# Patient Record
Sex: FEMALE | Race: BLACK OR AFRICAN AMERICAN | Employment: PART TIME | URBAN - METROPOLITAN AREA
[De-identification: names, ages, dates, MRNs, and addresses within clinical notes are randomized per-mention and may not be internally consistent; named-entity substitution may affect disease eponyms.]

---

## 2017-07-18 ENCOUNTER — HOSPITAL ENCOUNTER (OUTPATIENT)
Dept: GENERAL RADIOLOGY | Age: 27
Discharge: HOME | End: 2017-07-18

## 2017-07-18 ENCOUNTER — OFFICE VISIT (OUTPATIENT)
Dept: FAMILY MEDICINE CLINIC | Age: 27
End: 2017-07-18

## 2017-07-18 VITALS
WEIGHT: 183.2 LBS | HEART RATE: 75 BPM | TEMPERATURE: 97.8 F | RESPIRATION RATE: 16 BRPM | DIASTOLIC BLOOD PRESSURE: 75 MMHG | SYSTOLIC BLOOD PRESSURE: 123 MMHG | BODY MASS INDEX: 29.44 KG/M2 | OXYGEN SATURATION: 100 % | HEIGHT: 66 IN

## 2017-07-18 DIAGNOSIS — M54.2 NECK PAIN: ICD-10-CM

## 2017-07-18 DIAGNOSIS — M54.2 NECK PAIN: Primary | ICD-10-CM

## 2017-07-18 DIAGNOSIS — S20.212A CONTUSION OF CHEST WALL, LEFT, INITIAL ENCOUNTER: ICD-10-CM

## 2017-07-18 DIAGNOSIS — S80.00XA CONTUSION OF KNEE, UNSPECIFIED LATERALITY, INITIAL ENCOUNTER: ICD-10-CM
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
## 2017-07-18 DIAGNOSIS — R07.89 CHEST WALL PAIN: ICD-10-CM

## 2017-07-18 PROCEDURE — 99214 OFFICE O/P EST MOD 30 MIN: CPT | Performed by: NURSE PRACTITIONER

## 2017-07-18 RX ORDER — CYCLOBENZAPRINE HCL 10 MG
5-10 TABLET ORAL 3 TIMES DAILY PRN
Qty: 30 TABLET | Refills: 0 | Status: SHIPPED | OUTPATIENT
Start: 2017-07-18 | End: 2019-02-01

## 2017-07-18 RX ORDER — NAPROXEN 500 MG/1
500 TABLET ORAL 2 TIMES DAILY WITH MEALS
Qty: 60 TABLET | Refills: 0 | Status: SHIPPED | OUTPATIENT
Start: 2017-07-18 | End: 2019-02-01

## 2017-07-18 ASSESSMENT — ENCOUNTER SYMPTOMS
BACK PAIN: 0
EYES NEGATIVE: 1
NAUSEA: 0
ALLERGIC/IMMUNOLOGIC NEGATIVE: 1
VOMITING: 0
COUGH: 0
SPUTUM PRODUCTION: 0
GASTROINTESTINAL NEGATIVE: 1
SORE THROAT: 0
TROUBLE SWALLOWING: 0
CHEST TIGHTNESS: 0
SHORTNESS OF BREATH: 0
ABDOMINAL PAIN: 0
HEMOPTYSIS: 0
ORTHOPNEA: 0
PHOTOPHOBIA: 0
VISUAL CHANGE: 0

## 2017-07-18 ASSESSMENT — PATIENT HEALTH QUESTIONNAIRE - PHQ9
2. FEELING DOWN, DEPRESSED OR HOPELESS: 0
SUM OF ALL RESPONSES TO PHQ9 QUESTIONS 1 & 2: 0
SUM OF ALL RESPONSES TO PHQ QUESTIONS 1-9: 0
1. LITTLE INTEREST OR PLEASURE IN DOING THINGS: 0

## 2017-07-18 ASSESSMENT — COPD QUESTIONNAIRES: COPD: 0

## 2017-07-19 ENCOUNTER — TELEPHONE (OUTPATIENT)
Dept: FAMILY MEDICINE CLINIC | Age: 27
End: 2017-07-19

## 2017-07-26 ENCOUNTER — OFFICE VISIT (OUTPATIENT)
Dept: FAMILY MEDICINE CLINIC | Age: 27
End: 2017-07-26

## 2017-07-26 VITALS
WEIGHT: 182 LBS | SYSTOLIC BLOOD PRESSURE: 115 MMHG | HEIGHT: 66 IN | OXYGEN SATURATION: 97 % | BODY MASS INDEX: 29.25 KG/M2 | DIASTOLIC BLOOD PRESSURE: 77 MMHG | TEMPERATURE: 98.1 F | HEART RATE: 69 BPM | RESPIRATION RATE: 16 BRPM

## 2017-07-26 DIAGNOSIS — M25.562 ACUTE PAIN OF BOTH KNEES: Primary | ICD-10-CM

## 2017-07-26 DIAGNOSIS — S80.00XA CONTUSION OF KNEE, UNSPECIFIED LATERALITY, INITIAL ENCOUNTER: ICD-10-CM

## 2017-07-26 DIAGNOSIS — M25.561 ACUTE PAIN OF BOTH KNEES: Primary | ICD-10-CM
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
## 2017-07-26 PROCEDURE — 99213 OFFICE O/P EST LOW 20 MIN: CPT | Performed by: NURSE PRACTITIONER

## 2017-07-26 RX ORDER — MELOXICAM 15 MG/1
15 TABLET ORAL DAILY
Qty: 30 TABLET | Refills: 0 | Status: SHIPPED | OUTPATIENT
Start: 2017-07-26 | End: 2019-02-01

## 2017-07-26 ASSESSMENT — ENCOUNTER SYMPTOMS
RESPIRATORY NEGATIVE: 1
COLOR CHANGE: 1
GASTROINTESTINAL NEGATIVE: 1
EYES NEGATIVE: 1
ALLERGIC/IMMUNOLOGIC NEGATIVE: 1

## 2019-02-01 ENCOUNTER — OFFICE VISIT (OUTPATIENT)
Dept: ORTHOPEDIC SURGERY | Age: 29
End: 2019-02-01
Payer: MEDICAID

## 2019-02-01 ENCOUNTER — ANESTHESIA EVENT (OUTPATIENT)
Dept: OPERATING ROOM | Age: 29
End: 2019-02-01
Payer: MEDICAID

## 2019-02-01 ENCOUNTER — TELEPHONE (OUTPATIENT)
Dept: ORTHOPEDIC SURGERY | Age: 29
End: 2019-02-01

## 2019-02-01 VITALS
HEART RATE: 96 BPM | BODY MASS INDEX: 29.27 KG/M2 | HEIGHT: 66 IN | DIASTOLIC BLOOD PRESSURE: 88 MMHG | WEIGHT: 182.1 LBS | SYSTOLIC BLOOD PRESSURE: 133 MMHG

## 2019-02-01 DIAGNOSIS — S82.891A CLOSED FRACTURE OF RIGHT ANKLE, INITIAL ENCOUNTER: ICD-10-CM

## 2019-02-01 DIAGNOSIS — M25.571 RIGHT ANKLE PAIN, UNSPECIFIED CHRONICITY: Primary | ICD-10-CM

## 2019-02-01 PROCEDURE — 99203 OFFICE O/P NEW LOW 30 MIN: CPT | Performed by: ORTHOPAEDIC SURGERY

## 2019-02-04 ENCOUNTER — HOSPITAL ENCOUNTER (OUTPATIENT)
Age: 29
Setting detail: OUTPATIENT SURGERY
Discharge: HOME | End: 2019-02-04
Attending: ORTHOPAEDIC SURGERY | Admitting: ORTHOPAEDIC SURGERY
Payer: MEDICAID

## 2019-02-04 ENCOUNTER — ANESTHESIA (OUTPATIENT)
Dept: OPERATING ROOM | Age: 29
End: 2019-02-04
Payer: MEDICAID

## 2019-02-04 VITALS
DIASTOLIC BLOOD PRESSURE: 71 MMHG | TEMPERATURE: 97 F | WEIGHT: 180 LBS | SYSTOLIC BLOOD PRESSURE: 121 MMHG | BODY MASS INDEX: 28.93 KG/M2 | HEIGHT: 66 IN | RESPIRATION RATE: 16 BRPM | OXYGEN SATURATION: 98 % | HEART RATE: 76 BPM

## 2019-02-04 VITALS
TEMPERATURE: 98.6 F | DIASTOLIC BLOOD PRESSURE: 51 MMHG | SYSTOLIC BLOOD PRESSURE: 101 MMHG | OXYGEN SATURATION: 97 % | RESPIRATION RATE: 10 BRPM

## 2019-02-04 DIAGNOSIS — S82.891D CLOSED FRACTURE OF RIGHT ANKLE WITH ROUTINE HEALING, SUBSEQUENT ENCOUNTER: Primary | ICD-10-CM

## 2019-02-04 LAB — PREGNANCY, URINE: NEGATIVE

## 2019-02-04 PROCEDURE — 3600000014 HC SURGERY LEVEL 4 ADDTL 15MIN: Performed by: ORTHOPAEDIC SURGERY

## 2019-02-04 PROCEDURE — C1713 ANCHOR/SCREW BN/BN,TIS/BN: HCPCS | Performed by: ORTHOPAEDIC SURGERY

## 2019-02-04 PROCEDURE — 3600000004 HC SURGERY LEVEL 4 BASE: Performed by: ORTHOPAEDIC SURGERY

## 2019-02-04 PROCEDURE — 7100000001 HC PACU RECOVERY - ADDTL 15 MIN: Performed by: ORTHOPAEDIC SURGERY

## 2019-02-04 PROCEDURE — 84703 CHORIONIC GONADOTROPIN ASSAY: CPT

## 2019-02-04 PROCEDURE — 6360000002 HC RX W HCPCS: Performed by: NURSE ANESTHETIST, CERTIFIED REGISTERED

## 2019-02-04 PROCEDURE — 2709999900 HC NON-CHARGEABLE SUPPLY: Performed by: ORTHOPAEDIC SURGERY

## 2019-02-04 PROCEDURE — 7100000010 HC PHASE II RECOVERY - FIRST 15 MIN: Performed by: ORTHOPAEDIC SURGERY

## 2019-02-04 PROCEDURE — 64445 NJX AA&/STRD SCIATIC NRV IMG: CPT | Performed by: ANESTHESIOLOGY

## 2019-02-04 PROCEDURE — 7100000000 HC PACU RECOVERY - FIRST 15 MIN: Performed by: ORTHOPAEDIC SURGERY

## 2019-02-04 PROCEDURE — 2580000003 HC RX 258

## 2019-02-04 PROCEDURE — 3700000000 HC ANESTHESIA ATTENDED CARE: Performed by: ORTHOPAEDIC SURGERY

## 2019-02-04 PROCEDURE — 64447 NJX AA&/STRD FEMORAL NRV IMG: CPT | Performed by: ANESTHESIOLOGY

## 2019-02-04 PROCEDURE — 6360000002 HC RX W HCPCS: Performed by: ANESTHESIOLOGY

## 2019-02-04 PROCEDURE — 3700000001 HC ADD 15 MINUTES (ANESTHESIA): Performed by: ORTHOPAEDIC SURGERY

## 2019-02-04 PROCEDURE — 7100000011 HC PHASE II RECOVERY - ADDTL 15 MIN: Performed by: ORTHOPAEDIC SURGERY

## 2019-02-04 PROCEDURE — 2580000003 HC RX 258: Performed by: NURSE ANESTHETIST, CERTIFIED REGISTERED

## 2019-02-04 PROCEDURE — 6360000002 HC RX W HCPCS: Performed by: ORTHOPAEDIC SURGERY

## 2019-02-04 PROCEDURE — 2500000003 HC RX 250 WO HCPCS: Performed by: ANESTHESIOLOGY

## 2019-02-04 PROCEDURE — 2720000010 HC SURG SUPPLY STERILE: Performed by: ORTHOPAEDIC SURGERY

## 2019-02-04 PROCEDURE — 2500000003 HC RX 250 WO HCPCS

## 2019-02-04 DEVICE — EVOS 3.5MM X 13MM CORTEX SCREW SELF-TAPPING
Type: IMPLANTABLE DEVICE | Site: ANKLE | Status: FUNCTIONAL
Brand: EVOS

## 2019-02-04 DEVICE — EVOS 2.7MM X 15MM LOCKING SCREW T8 SELF-TAPPING
Type: IMPLANTABLE DEVICE | Site: ANKLE | Status: FUNCTIONAL
Brand: EVOS

## 2019-02-04 DEVICE — EVOS 2.7MM X 12MM LOCKING SCREW T8 SELF-TAPPING
Type: IMPLANTABLE DEVICE | Site: ANKLE | Status: FUNCTIONAL
Brand: EVOS

## 2019-02-04 DEVICE — EVOS 2.7MM X 24MM CORTEX SCREW T8 SELF-TAPPING
Type: IMPLANTABLE DEVICE | Site: ANKLE | Status: FUNCTIONAL
Brand: EVOS

## 2019-02-04 DEVICE — KIT INVISIKNOT ANKLE FRACTURE
Type: IMPLANTABLE DEVICE | Site: ANKLE | Status: FUNCTIONAL
Brand: INVISIKNOT

## 2019-02-04 DEVICE — EVOS 3.5MM X 12MM CORTEX SCREW SELF-TAPPING
Type: IMPLANTABLE DEVICE | Site: ANKLE | Status: FUNCTIONAL
Brand: EVOS

## 2019-02-04 DEVICE — EVOS 3.5MM X 14MM CORTEX SCREW SELF-TAPPING
Type: IMPLANTABLE DEVICE | Site: ANKLE | Status: FUNCTIONAL
Brand: EVOS

## 2019-02-04 DEVICE — EVOS 2.7MM X 14MM LOCKING SCREW T8 SELF-TAPPING
Type: IMPLANTABLE DEVICE | Site: ANKLE | Status: FUNCTIONAL
Brand: EVOS

## 2019-02-04 DEVICE — PERI-LOC K-WIRE 1.6MM X 150MM                                    LENGTH TROCAR POINT
Type: IMPLANTABLE DEVICE | Site: ANKLE | Status: FUNCTIONAL
Brand: PERI-LOC

## 2019-02-04 DEVICE — EVOS 2.7MM/3.5MM LATERAL DISTAL                                    FIBULA PLATE 5 HOLE RIGHT 81MM
Type: IMPLANTABLE DEVICE | Site: ANKLE | Status: FUNCTIONAL
Brand: EVOS

## 2019-02-04 DEVICE — EVOS 2.7MM X 10MM LOCKING SCREW T8 SELF-TAPPING
Type: IMPLANTABLE DEVICE | Site: ANKLE | Status: FUNCTIONAL
Brand: EVOS

## 2019-02-04 RX ORDER — LIDOCAINE HYDROCHLORIDE 10 MG/ML
1 INJECTION, SOLUTION EPIDURAL; INFILTRATION; INTRACAUDAL; PERINEURAL
Status: COMPLETED | OUTPATIENT
Start: 2019-02-04 | End: 2019-02-04

## 2019-02-04 RX ORDER — OXYCODONE HYDROCHLORIDE AND ACETAMINOPHEN 5; 325 MG/1; MG/1
1 TABLET ORAL PRN
Status: DISCONTINUED | OUTPATIENT
Start: 2019-02-04 | End: 2019-02-04 | Stop reason: HOSPADM

## 2019-02-04 RX ORDER — KETOROLAC TROMETHAMINE 30 MG/ML
INJECTION, SOLUTION INTRAMUSCULAR; INTRAVENOUS PRN
Status: DISCONTINUED | OUTPATIENT
Start: 2019-02-04 | End: 2019-02-04 | Stop reason: SDUPTHER

## 2019-02-04 RX ORDER — ASPIRIN 325 MG
325 TABLET, DELAYED RELEASE (ENTERIC COATED) ORAL 2 TIMES DAILY WITH MEALS
Qty: 30 TABLET | Refills: 0 | Status: SHIPPED | OUTPATIENT
Start: 2019-02-04 | End: 2019-06-14

## 2019-02-04 RX ORDER — DIPHENHYDRAMINE HYDROCHLORIDE 50 MG/ML
12.5 INJECTION INTRAMUSCULAR; INTRAVENOUS
Status: DISCONTINUED | OUTPATIENT
Start: 2019-02-04 | End: 2019-02-04 | Stop reason: HOSPADM

## 2019-02-04 RX ORDER — MEPERIDINE HYDROCHLORIDE 25 MG/ML
12.5 INJECTION INTRAMUSCULAR; INTRAVENOUS; SUBCUTANEOUS EVERY 5 MIN PRN
Status: DISCONTINUED | OUTPATIENT
Start: 2019-02-04 | End: 2019-02-04 | Stop reason: HOSPADM

## 2019-02-04 RX ORDER — OXYCODONE HYDROCHLORIDE AND ACETAMINOPHEN 5; 325 MG/1; MG/1
2 TABLET ORAL PRN
Status: DISCONTINUED | OUTPATIENT
Start: 2019-02-04 | End: 2019-02-04 | Stop reason: HOSPADM

## 2019-02-04 RX ORDER — MIDAZOLAM HYDROCHLORIDE 1 MG/ML
INJECTION INTRAMUSCULAR; INTRAVENOUS
Status: COMPLETED
Start: 2019-02-04 | End: 2019-02-04

## 2019-02-04 RX ORDER — ONDANSETRON 2 MG/ML
INJECTION INTRAMUSCULAR; INTRAVENOUS PRN
Status: DISCONTINUED | OUTPATIENT
Start: 2019-02-04 | End: 2019-02-04 | Stop reason: SDUPTHER

## 2019-02-04 RX ORDER — SODIUM CHLORIDE 0.9 % (FLUSH) 0.9 %
10 SYRINGE (ML) INJECTION PRN
Status: DISCONTINUED | OUTPATIENT
Start: 2019-02-04 | End: 2019-02-04 | Stop reason: HOSPADM

## 2019-02-04 RX ORDER — BUPIVACAINE HYDROCHLORIDE 5 MG/ML
INJECTION, SOLUTION EPIDURAL; INTRACAUDAL
Status: COMPLETED
Start: 2019-02-04 | End: 2019-02-04

## 2019-02-04 RX ORDER — LIDOCAINE HYDROCHLORIDE 20 MG/ML
INJECTION, SOLUTION INFILTRATION; PERINEURAL PRN
Status: DISCONTINUED | OUTPATIENT
Start: 2019-02-04 | End: 2019-02-04 | Stop reason: SDUPTHER

## 2019-02-04 RX ORDER — LIDOCAINE HYDROCHLORIDE 20 MG/ML
INJECTION, SOLUTION INFILTRATION; PERINEURAL
Status: COMPLETED
Start: 2019-02-04 | End: 2019-02-04

## 2019-02-04 RX ORDER — OXYCODONE HYDROCHLORIDE AND ACETAMINOPHEN 5; 325 MG/1; MG/1
1 TABLET ORAL EVERY 6 HOURS PRN
Qty: 28 TABLET | Refills: 0 | Status: SHIPPED | OUTPATIENT
Start: 2019-02-04 | End: 2019-02-11

## 2019-02-04 RX ORDER — BUPIVACAINE HYDROCHLORIDE 2.5 MG/ML
INJECTION, SOLUTION EPIDURAL; INFILTRATION; INTRACAUDAL PRN
Status: DISCONTINUED | OUTPATIENT
Start: 2019-02-04 | End: 2019-02-04 | Stop reason: SDUPTHER

## 2019-02-04 RX ORDER — IBUPROFEN 600 MG/1
600 TABLET ORAL EVERY 6 HOURS PRN
Status: ON HOLD | COMMUNITY
End: 2019-02-04 | Stop reason: HOSPADM

## 2019-02-04 RX ORDER — CEFAZOLIN SODIUM 2 G/50ML
2 SOLUTION INTRAVENOUS ONCE
Status: COMPLETED | OUTPATIENT
Start: 2019-02-04 | End: 2019-02-04

## 2019-02-04 RX ORDER — ONDANSETRON 4 MG/1
4 TABLET, FILM COATED ORAL EVERY 8 HOURS PRN
Qty: 20 TABLET | Refills: 0 | Status: SHIPPED | OUTPATIENT
Start: 2019-02-04 | End: 2019-06-14

## 2019-02-04 RX ORDER — LIDOCAINE HYDROCHLORIDE 10 MG/ML
INJECTION, SOLUTION EPIDURAL; INFILTRATION; INTRACAUDAL; PERINEURAL
Status: COMPLETED
Start: 2019-02-04 | End: 2019-02-04

## 2019-02-04 RX ORDER — ACETAMINOPHEN 10 MG/ML
1000 INJECTION, SOLUTION INTRAVENOUS ONCE
Status: COMPLETED | OUTPATIENT
Start: 2019-02-04 | End: 2019-02-04

## 2019-02-04 RX ORDER — MORPHINE SULFATE 10 MG/ML
1 INJECTION, SOLUTION INTRAMUSCULAR; INTRAVENOUS EVERY 5 MIN PRN
Status: DISCONTINUED | OUTPATIENT
Start: 2019-02-04 | End: 2019-02-04 | Stop reason: HOSPADM

## 2019-02-04 RX ORDER — HYDRALAZINE HYDROCHLORIDE 20 MG/ML
5 INJECTION INTRAMUSCULAR; INTRAVENOUS EVERY 10 MIN PRN
Status: DISCONTINUED | OUTPATIENT
Start: 2019-02-04 | End: 2019-02-04 | Stop reason: HOSPADM

## 2019-02-04 RX ORDER — FENTANYL CITRATE 50 UG/ML
INJECTION, SOLUTION INTRAMUSCULAR; INTRAVENOUS PRN
Status: DISCONTINUED | OUTPATIENT
Start: 2019-02-04 | End: 2019-02-04 | Stop reason: SDUPTHER

## 2019-02-04 RX ORDER — GLYCOPYRROLATE 0.2 MG/ML
INJECTION INTRAMUSCULAR; INTRAVENOUS PRN
Status: DISCONTINUED | OUTPATIENT
Start: 2019-02-04 | End: 2019-02-04 | Stop reason: SDUPTHER

## 2019-02-04 RX ORDER — ONDANSETRON 2 MG/ML
4 INJECTION INTRAMUSCULAR; INTRAVENOUS
Status: DISCONTINUED | OUTPATIENT
Start: 2019-02-04 | End: 2019-02-04 | Stop reason: HOSPADM

## 2019-02-04 RX ORDER — BUPIVACAINE HYDROCHLORIDE 5 MG/ML
INJECTION, SOLUTION EPIDURAL; INTRACAUDAL PRN
Status: DISCONTINUED | OUTPATIENT
Start: 2019-02-04 | End: 2019-02-04 | Stop reason: SDUPTHER

## 2019-02-04 RX ORDER — SODIUM CHLORIDE, SODIUM LACTATE, POTASSIUM CHLORIDE, CALCIUM CHLORIDE 600; 310; 30; 20 MG/100ML; MG/100ML; MG/100ML; MG/100ML
INJECTION, SOLUTION INTRAVENOUS
Status: COMPLETED
Start: 2019-02-04 | End: 2019-02-04

## 2019-02-04 RX ORDER — MIDAZOLAM HYDROCHLORIDE 1 MG/ML
INJECTION INTRAMUSCULAR; INTRAVENOUS PRN
Status: DISCONTINUED | OUTPATIENT
Start: 2019-02-04 | End: 2019-02-04 | Stop reason: SDUPTHER

## 2019-02-04 RX ORDER — SODIUM CHLORIDE, SODIUM LACTATE, POTASSIUM CHLORIDE, CALCIUM CHLORIDE 600; 310; 30; 20 MG/100ML; MG/100ML; MG/100ML; MG/100ML
INJECTION, SOLUTION INTRAVENOUS CONTINUOUS
Status: DISCONTINUED | OUTPATIENT
Start: 2019-02-04 | End: 2019-02-04 | Stop reason: HOSPADM

## 2019-02-04 RX ORDER — DEXAMETHASONE SODIUM PHOSPHATE 4 MG/ML
INJECTION, SOLUTION INTRA-ARTICULAR; INTRALESIONAL; INTRAMUSCULAR; INTRAVENOUS; SOFT TISSUE PRN
Status: DISCONTINUED | OUTPATIENT
Start: 2019-02-04 | End: 2019-02-04 | Stop reason: SDUPTHER

## 2019-02-04 RX ORDER — PROMETHAZINE HYDROCHLORIDE 25 MG/ML
6.25 INJECTION, SOLUTION INTRAMUSCULAR; INTRAVENOUS
Status: DISCONTINUED | OUTPATIENT
Start: 2019-02-04 | End: 2019-02-04 | Stop reason: HOSPADM

## 2019-02-04 RX ORDER — LABETALOL HYDROCHLORIDE 5 MG/ML
5 INJECTION, SOLUTION INTRAVENOUS EVERY 10 MIN PRN
Status: DISCONTINUED | OUTPATIENT
Start: 2019-02-04 | End: 2019-02-04 | Stop reason: HOSPADM

## 2019-02-04 RX ORDER — SODIUM CHLORIDE, SODIUM LACTATE, POTASSIUM CHLORIDE, CALCIUM CHLORIDE 600; 310; 30; 20 MG/100ML; MG/100ML; MG/100ML; MG/100ML
INJECTION, SOLUTION INTRAVENOUS CONTINUOUS PRN
Status: DISCONTINUED | OUTPATIENT
Start: 2019-02-04 | End: 2019-02-04 | Stop reason: SDUPTHER

## 2019-02-04 RX ORDER — PROPOFOL 10 MG/ML
INJECTION, EMULSION INTRAVENOUS PRN
Status: DISCONTINUED | OUTPATIENT
Start: 2019-02-04 | End: 2019-02-04 | Stop reason: SDUPTHER

## 2019-02-04 RX ORDER — SODIUM CHLORIDE 0.9 % (FLUSH) 0.9 %
10 SYRINGE (ML) INJECTION EVERY 12 HOURS SCHEDULED
Status: DISCONTINUED | OUTPATIENT
Start: 2019-02-04 | End: 2019-02-04 | Stop reason: HOSPADM

## 2019-02-04 RX ORDER — DOCUSATE SODIUM 100 MG/1
100 CAPSULE, LIQUID FILLED ORAL 2 TIMES DAILY PRN
Qty: 20 CAPSULE | Refills: 0 | Status: SHIPPED | OUTPATIENT
Start: 2019-02-04 | End: 2019-03-06

## 2019-02-04 RX ORDER — MORPHINE SULFATE 10 MG/ML
2 INJECTION, SOLUTION INTRAMUSCULAR; INTRAVENOUS EVERY 5 MIN PRN
Status: DISCONTINUED | OUTPATIENT
Start: 2019-02-04 | End: 2019-02-04 | Stop reason: HOSPADM

## 2019-02-04 RX ORDER — BUPIVACAINE HYDROCHLORIDE 2.5 MG/ML
INJECTION, SOLUTION EPIDURAL; INFILTRATION; INTRACAUDAL
Status: COMPLETED
Start: 2019-02-04 | End: 2019-02-04

## 2019-02-04 RX ADMIN — PROPOFOL 200 MG: 10 INJECTION, EMULSION INTRAVENOUS at 13:42

## 2019-02-04 RX ADMIN — SODIUM CHLORIDE, SODIUM LACTATE, POTASSIUM CHLORIDE, CALCIUM CHLORIDE: 600; 310; 30; 20 INJECTION, SOLUTION INTRAVENOUS at 12:24

## 2019-02-04 RX ADMIN — LIDOCAINE HYDROCHLORIDE 0.1 ML: 10 INJECTION, SOLUTION EPIDURAL; INFILTRATION; INTRACAUDAL; PERINEURAL at 12:23

## 2019-02-04 RX ADMIN — FENTANYL CITRATE 50 MCG: 50 INJECTION INTRAMUSCULAR; INTRAVENOUS at 14:09

## 2019-02-04 RX ADMIN — FENTANYL CITRATE 50 MCG: 50 INJECTION INTRAMUSCULAR; INTRAVENOUS at 13:53

## 2019-02-04 RX ADMIN — HYDROMORPHONE HYDROCHLORIDE 0.5 MG: 1 INJECTION, SOLUTION INTRAMUSCULAR; INTRAVENOUS; SUBCUTANEOUS at 16:02

## 2019-02-04 RX ADMIN — FENTANYL CITRATE 50 MCG: 50 INJECTION INTRAMUSCULAR; INTRAVENOUS at 13:50

## 2019-02-04 RX ADMIN — FENTANYL CITRATE 50 MCG: 50 INJECTION INTRAMUSCULAR; INTRAVENOUS at 14:08

## 2019-02-04 RX ADMIN — SODIUM CHLORIDE, POTASSIUM CHLORIDE, SODIUM LACTATE AND CALCIUM CHLORIDE: 600; 310; 30; 20 INJECTION, SOLUTION INTRAVENOUS at 14:00

## 2019-02-04 RX ADMIN — LIDOCAINE HYDROCHLORIDE 1 ML: 20 INJECTION, SOLUTION INFILTRATION; PERINEURAL at 12:40

## 2019-02-04 RX ADMIN — CEFAZOLIN SODIUM 2 G: 2 SOLUTION INTRAVENOUS at 13:36

## 2019-02-04 RX ADMIN — BUPIVACAINE HYDROCHLORIDE 30 ML: 2.5 INJECTION, SOLUTION EPIDURAL; INFILTRATION; INTRACAUDAL; PERINEURAL at 12:40

## 2019-02-04 RX ADMIN — DEXAMETHASONE SODIUM PHOSPHATE 8 MG: 4 INJECTION, SOLUTION INTRAMUSCULAR; INTRAVENOUS at 13:53

## 2019-02-04 RX ADMIN — KETOROLAC TROMETHAMINE 30 MG: 30 INJECTION, SOLUTION INTRAMUSCULAR; INTRAVENOUS at 15:14

## 2019-02-04 RX ADMIN — SODIUM CHLORIDE, POTASSIUM CHLORIDE, SODIUM LACTATE AND CALCIUM CHLORIDE: 600; 310; 30; 20 INJECTION, SOLUTION INTRAVENOUS at 13:19

## 2019-02-04 RX ADMIN — SODIUM CHLORIDE, POTASSIUM CHLORIDE, SODIUM LACTATE AND CALCIUM CHLORIDE: 600; 310; 30; 20 INJECTION, SOLUTION INTRAVENOUS at 12:24

## 2019-02-04 RX ADMIN — BUPIVACAINE HYDROCHLORIDE 20 ML: 5 INJECTION, SOLUTION EPIDURAL; INTRACAUDAL; PERINEURAL at 12:40

## 2019-02-04 RX ADMIN — GLYCOPYRROLATE 0.2 MG: 0.2 INJECTION INTRAMUSCULAR; INTRAVENOUS at 14:00

## 2019-02-04 RX ADMIN — MIDAZOLAM 4 MG: 1 INJECTION INTRAMUSCULAR; INTRAVENOUS at 12:40

## 2019-02-04 RX ADMIN — ONDANSETRON 4 MG: 2 INJECTION, SOLUTION INTRAMUSCULAR; INTRAVENOUS at 13:55

## 2019-02-04 RX ADMIN — HYDROMORPHONE HYDROCHLORIDE 0.5 MG: 1 INJECTION, SOLUTION INTRAMUSCULAR; INTRAVENOUS; SUBCUTANEOUS at 15:46

## 2019-02-04 RX ADMIN — ACETAMINOPHEN 1000 MG: 10 INJECTION, SOLUTION INTRAVENOUS at 12:57

## 2019-02-04 RX ADMIN — HYDROMORPHONE HYDROCHLORIDE 0.5 MG: 1 INJECTION, SOLUTION INTRAMUSCULAR; INTRAVENOUS; SUBCUTANEOUS at 15:54

## 2019-02-04 ASSESSMENT — PAIN SCALES - GENERAL
PAINLEVEL_OUTOF10: 6
PAINLEVEL_OUTOF10: 8
PAINLEVEL_OUTOF10: 7
PAINLEVEL_OUTOF10: 4
PAINLEVEL_OUTOF10: 0

## 2019-02-04 ASSESSMENT — PULMONARY FUNCTION TESTS
PIF_VALUE: 2
PIF_VALUE: 3
PIF_VALUE: 2
PIF_VALUE: 3
PIF_VALUE: 2
PIF_VALUE: 2
PIF_VALUE: 3
PIF_VALUE: 2
PIF_VALUE: 3
PIF_VALUE: 2
PIF_VALUE: 2
PIF_VALUE: 3
PIF_VALUE: 2
PIF_VALUE: 4
PIF_VALUE: 2
PIF_VALUE: 3
PIF_VALUE: 2
PIF_VALUE: 3
PIF_VALUE: 2
PIF_VALUE: 3
PIF_VALUE: 3
PIF_VALUE: 2
PIF_VALUE: 3
PIF_VALUE: 0
PIF_VALUE: 3
PIF_VALUE: 2
PIF_VALUE: 25
PIF_VALUE: 1
PIF_VALUE: 3
PIF_VALUE: 3
PIF_VALUE: 2
PIF_VALUE: 3
PIF_VALUE: 4
PIF_VALUE: 3
PIF_VALUE: 2
PIF_VALUE: 0
PIF_VALUE: 3
PIF_VALUE: 4
PIF_VALUE: 3
PIF_VALUE: 3
PIF_VALUE: 2
PIF_VALUE: 2
PIF_VALUE: 6
PIF_VALUE: 3
PIF_VALUE: 4
PIF_VALUE: 3
PIF_VALUE: 2
PIF_VALUE: 3
PIF_VALUE: 2
PIF_VALUE: 2
PIF_VALUE: 4
PIF_VALUE: 3
PIF_VALUE: 2
PIF_VALUE: 3
PIF_VALUE: 1
PIF_VALUE: 2
PIF_VALUE: 3
PIF_VALUE: 2
PIF_VALUE: 3
PIF_VALUE: 2
PIF_VALUE: 3
PIF_VALUE: 6
PIF_VALUE: 3
PIF_VALUE: 3
PIF_VALUE: 2
PIF_VALUE: 3
PIF_VALUE: 3
PIF_VALUE: 23
PIF_VALUE: 3
PIF_VALUE: 1
PIF_VALUE: 2
PIF_VALUE: 2
PIF_VALUE: 3
PIF_VALUE: 3
PIF_VALUE: 2

## 2019-02-04 ASSESSMENT — PAIN DESCRIPTION - LOCATION
LOCATION: ANKLE

## 2019-02-04 ASSESSMENT — PAIN DESCRIPTION - DESCRIPTORS
DESCRIPTORS: SHARP

## 2019-02-04 ASSESSMENT — PAIN - FUNCTIONAL ASSESSMENT
PAIN_FUNCTIONAL_ASSESSMENT: 0-10
PAIN_FUNCTIONAL_ASSESSMENT: INTOLERABLE, UNABLE TO DO ANY ACTIVE OR PASSIVE ACTIVITIES

## 2019-02-04 ASSESSMENT — PAIN DESCRIPTION - FREQUENCY
FREQUENCY: CONTINUOUS

## 2019-02-04 ASSESSMENT — PAIN DESCRIPTION - ORIENTATION
ORIENTATION: RIGHT

## 2019-02-04 NOTE — BRIEF OP NOTE
Brief Postoperative Note  ______________________________________________________________    Patient: Madison Dasilva  YOB: 1990  MRN: [id number]  Date of Procedure: 2/4/2019    Pre-Op Diagnosis: RIGHT ANKLE FRACTURE    Post-Op Diagnosis: Same       Procedure(s):  1. ORIF right ankle lateral malleolus (12097)  2.  ORIF right ankle syndesmosis (67434)    Anesthesia: General    Surgeon(s):  Abdullahi Gutierrez MD    Assistant: Sarah Story SA    Estimated Blood Loss (mL): less than 50     Complications: None    Specimens:   * No specimens in log *    Implants:  * No implants in log *      Antibiotics: 2 g Ancef IV prior to incision     Findings: displaced right distal fibula fracture at the level of the syndesmosis with medial joint space widening    Abdullahi Gutierrez MD  Date: 2/4/2019  Time: 3:39 PM

## 2019-02-04 NOTE — OP NOTE
Operative Note    Patient: Zuleima Howell  YOB: 1990  MRN: [id number]  Date of Procedure: 2/4/2019    Pre-Op Diagnosis: RIGHT ANKLE FRACTURE    Post-Op Diagnosis: Same       Procedure(s):  1. ORIF right ankle lateral malleolus (27113)  2. ORIF right ankle syndesmosis (44655)    Anesthesia: General    Surgeon(s):  Michael Machado MD    Assistant: Diana Wells SA    Estimated Blood Loss (mL): less than 50     Complications: None    Specimens:   * No specimens in log *    Implants:  * No implants in log *      Antibiotics: 2 g Ancef IV prior to incision     Findings: displaced right distal fibula fracture at the level of the syndesmosis with medial joint space widening       IMPLANTS USED:  [facility] and [employer] ankle 5 hole plate with 2.7 mm distal locking screws, [employer]    INDICATIONS FOR OPERATION:  The patient sustained a right ankle fracture with displacement and widening of the medial clear space while walking her dog 2 weeks ago. We discussed that this is an unstable injury and surgical fixation is recommended. We also discussed all risks, benefits, and alternatives to treatment. The patient chose to proceed with operative intervention. At no time were guarantees implied or stated. Risks and benefits of the procedure were fully explained in detail, including but not limited to infection, neurovascular injury, continued pain, arthritis, stiffness, need for further surgery, re-injury, DVT, PE, general risks of anesthesia and loss of limb or life.      DESCRIPTION OF OPERATION:  The patient was seen in the holding area, the right lower extremity marked with an indelible pen. Regional anesthesia was administered by anesthesia and please see their notes for further detail. They were taken to the operative suite, identified, and placed on the OR table. General anesthesia was administered. A bump was placed under the operative side hip.   A well-padded tourniquet was

## 2019-02-04 NOTE — H&P
Pre-operative History and Physical    Raquel Wilkes (1990)  2019 Date         CHIEF COMPLAINT:  right ankle fracture      HISTORY OF PRESENT ILLNESS:                The patient is a 34 y.o. female who presents with above chief complaint. She sustained a right ankle fracture 2 weeks ago. She was diagnosed with a displaced fibula fracture with joint space widening. She is here today for surgical treatment. She denies any new major issues today. Past Medical History:        Diagnosis Date    Febrile seizure ([address]     age 13        Past Surgical History:        Procedure Laterality Date    SHOULDER SURGERY      right shoulder       Current Medications:   Current Facility-Administered Medications: ceFAZolin (ANCEF) 2 g in dextrose 3 % 50 mL IVPB (duplex), 2 g, Intravenous, Once  acetaminophen (OFIRMEV) infusion 1,000 mg, 1,000 mg, Intravenous, Once  lactated ringers infusion, , Intravenous, Continuous  sodium chloride flush 0.9 % injection 10 mL, 10 mL, Intravenous, 2 times per day  sodium chloride flush 0.9 % injection 10 mL, 10 mL, Intravenous, PRN  EPINEPHrine 1 MG/ML injection, , ,   midazolam (VERSED) 2 MG/2ML injection, , ,   bupivacaine (PF) (MARCAINE) 0.25 % injection, , ,   bupivacaine (PF) (MARCAINE) 0.5 % injection, , ,   lidocaine 2 % injection, , ,     Allergies:  Patient has no known allergies.     Social History:   Non smoker    Family History:   Family History   Problem Relation Age of Onset    Emphysema Maternal Grandmother     Hypertension Maternal Grandfather         MI age 62,        REVIEW OF SYSTEMS:    Negative except ankle pain    Vitals:    19 1625   BP: 121/71   Pulse: 76   Resp: 16   Temp:    SpO2: 98%         PHYSICAL EXAM:      General: alert, appears stated age and cooperative   Skin: Clean and dry   Extremity: Distal NVI   DVT Exam: No evidence of DVT seen on physical exam.     Cardiovascular: palpable peripheral pulses, regular rate  Chest/lungs: non

## 2019-02-19 ENCOUNTER — OFFICE VISIT (OUTPATIENT)
Dept: ORTHOPEDIC SURGERY | Age: 29
End: 2019-02-19
Payer: MEDICAID

## 2019-02-19 ENCOUNTER — TELEPHONE (OUTPATIENT)
Dept: ORTHOPEDIC SURGERY | Age: 29
End: 2019-02-19

## 2019-02-19 VITALS — HEIGHT: 66 IN | WEIGHT: 179.9 LBS | BODY MASS INDEX: 28.91 KG/M2

## 2019-02-19 DIAGNOSIS — S82.891A CLOSED FRACTURE OF RIGHT ANKLE, INITIAL ENCOUNTER: ICD-10-CM

## 2019-02-19 DIAGNOSIS — M25.571 RIGHT ANKLE PAIN, UNSPECIFIED CHRONICITY: Primary | ICD-10-CM

## 2019-02-19 PROCEDURE — 99024 POSTOP FOLLOW-UP VISIT: CPT | Performed by: ORTHOPAEDIC SURGERY

## 2019-02-26 ENCOUNTER — TELEPHONE (OUTPATIENT)
Dept: ORTHOPEDIC SURGERY | Age: 29
End: 2019-02-26

## 2019-03-19 ENCOUNTER — OFFICE VISIT (OUTPATIENT)
Dept: ORTHOPEDIC SURGERY | Age: 29
End: 2019-03-19
Payer: MEDICAID

## 2019-03-19 VITALS — BODY MASS INDEX: 28.91 KG/M2 | WEIGHT: 179.9 LBS | HEIGHT: 66 IN

## 2019-03-19 DIAGNOSIS — S82.891A CLOSED FRACTURE OF RIGHT ANKLE, INITIAL ENCOUNTER: ICD-10-CM

## 2019-03-19 DIAGNOSIS — M25.571 RIGHT ANKLE PAIN, UNSPECIFIED CHRONICITY: Primary | ICD-10-CM

## 2019-03-19 PROCEDURE — 99024 POSTOP FOLLOW-UP VISIT: CPT | Performed by: ORTHOPAEDIC SURGERY

## 2019-04-04 ENCOUNTER — TELEPHONE (OUTPATIENT)
Dept: ORTHOPEDIC SURGERY | Age: 29
End: 2019-04-04

## 2019-05-08 ENCOUNTER — TELEPHONE (OUTPATIENT)
Dept: ORTHOPEDIC SURGERY | Age: 29
End: 2019-05-08

## 2019-05-08 NOTE — TELEPHONE ENCOUNTER
Received a voice mail from patient on Monday, 5/6/19, requesting a letter to return to work full duty on 5/14/19; patient did not leave her return phone number. I attempted to call the number on file, [phone], and have left several voice mails, 5/6: 3:14 & 4:28 p.m.; 5/7: 11:51 & 4:04, along with sent patient an email to ask to please contact me, and lastly 5/8: 9:58 a.m.  Patient has not returned any calls or email.---DMD

## 2019-06-14 ENCOUNTER — HOSPITAL ENCOUNTER (INPATIENT)
Age: 29
LOS: 2 days | Discharge: HOME | DRG: 754 | End: 2019-06-17
Attending: EMERGENCY MEDICINE | Admitting: PSYCHIATRY & NEUROLOGY
Payer: MEDICAID

## 2019-06-14 DIAGNOSIS — Z00.8 EVALUATION BY PSYCHIATRIC SERVICE REQUIRED: Primary | ICD-10-CM

## 2019-06-14 DIAGNOSIS — F10.929 ACUTE ALCOHOLIC INTOXICATION WITH COMPLICATION (HCC): ICD-10-CM

## 2019-06-14 LAB
AMPHETAMINE SCREEN, URINE: NORMAL
BACTERIA: ABNORMAL /HPF
BARBITURATE SCREEN URINE: NORMAL
BASOPHILS ABSOLUTE: 0 K/UL (ref 0–0.2)
BASOPHILS RELATIVE PERCENT: 0.4 %
BENZODIAZEPINE SCREEN, URINE: NORMAL
BILIRUBIN URINE: NEGATIVE
BLOOD, URINE: NEGATIVE
CANNABINOID SCREEN URINE: NORMAL
CLARITY: ABNORMAL
COCAINE METABOLITE SCREEN URINE: NORMAL
COLOR: YELLOW
EOSINOPHILS ABSOLUTE: 0.1 K/UL (ref 0–0.6)
EOSINOPHILS RELATIVE PERCENT: 0.6 %
EPITHELIAL CELLS, UA: ABNORMAL /HPF
GLUCOSE URINE: NEGATIVE MG/DL
HCG(URINE) PREGNANCY TEST: NEGATIVE
HCT VFR BLD CALC: 43.1 % (ref 36–48)
HEMOGLOBIN: 14.9 G/DL (ref 12–16)
KETONES, URINE: NEGATIVE MG/DL
LEUKOCYTE ESTERASE, URINE: NEGATIVE
LYMPHOCYTES ABSOLUTE: 1.9 K/UL (ref 1–5.1)
LYMPHOCYTES RELATIVE PERCENT: 20.3 %
Lab: NORMAL
MCH RBC QN AUTO: 33.2 PG (ref 26–34)
MCHC RBC AUTO-ENTMCNC: 34.5 G/DL (ref 31–36)
MCV RBC AUTO: 96.3 FL (ref 80–100)
METHADONE SCREEN, URINE: NORMAL
MICROSCOPIC EXAMINATION: YES
MONOCYTES ABSOLUTE: 0.5 K/UL (ref 0–1.3)
MONOCYTES RELATIVE PERCENT: 5.6 %
MUCUS: ABNORMAL /LPF
NEUTROPHILS ABSOLUTE: 6.8 K/UL (ref 1.7–7.7)
NEUTROPHILS RELATIVE PERCENT: 73.1 %
NITRITE, URINE: NEGATIVE
OPIATE SCREEN URINE: NORMAL
OXYCODONE URINE: NORMAL
PDW BLD-RTO: 13.3 % (ref 12.4–15.4)
PH UA: 7
PH UA: 7 (ref 5–8)
PHENCYCLIDINE SCREEN URINE: NORMAL
PLATELET # BLD: 280 K/UL (ref 135–450)
PMV BLD AUTO: 7.1 FL (ref 5–10.5)
PROPOXYPHENE SCREEN: NORMAL
PROTEIN UA: ABNORMAL MG/DL
RBC # BLD: 4.48 M/UL (ref 4–5.2)
RBC UA: ABNORMAL /HPF (ref 0–2)
SPECIFIC GRAVITY UA: 1.01 (ref 1–1.03)
URINE REFLEX TO CULTURE: ABNORMAL
URINE TYPE: ABNORMAL
UROBILINOGEN, URINE: 1 E.U./DL
WBC # BLD: 9.4 K/UL (ref 4–11)
WBC UA: ABNORMAL /HPF (ref 0–5)

## 2019-06-14 PROCEDURE — 81001 URINALYSIS AUTO W/SCOPE: CPT

## 2019-06-14 PROCEDURE — G0480 DRUG TEST DEF 1-7 CLASSES: HCPCS

## 2019-06-14 PROCEDURE — 99285 EMERGENCY DEPT VISIT HI MDM: CPT

## 2019-06-14 PROCEDURE — 80307 DRUG TEST PRSMV CHEM ANLYZR: CPT

## 2019-06-14 PROCEDURE — 85025 COMPLETE CBC W/AUTO DIFF WBC: CPT

## 2019-06-14 PROCEDURE — 84703 CHORIONIC GONADOTROPIN ASSAY: CPT

## 2019-06-14 PROCEDURE — 80053 COMPREHEN METABOLIC PANEL: CPT

## 2019-06-15 PROBLEM — F32.2 MAJOR DEPRESSIVE DISORDER, SEVERE (HCC): Status: ACTIVE | Noted: 2019-06-15

## 2019-06-15 LAB
A/G RATIO: 1.3 (ref 1.1–2.2)
ACETAMINOPHEN LEVEL: <5 UG/ML (ref 10–30)
ALBUMIN SERPL-MCNC: 4.4 G/DL (ref 3.4–5)
ALP BLD-CCNC: 136 U/L (ref 40–129)
ALT SERPL-CCNC: 14 U/L (ref 10–40)
ANION GAP SERPL CALCULATED.3IONS-SCNC: 16 MMOL/L (ref 3–16)
AST SERPL-CCNC: 25 U/L (ref 15–37)
BILIRUB SERPL-MCNC: 0.3 MG/DL (ref 0–1)
BUN BLDV-MCNC: 12 MG/DL (ref 7–20)
CALCIUM SERPL-MCNC: 8.9 MG/DL (ref 8.3–10.6)
CHLORIDE BLD-SCNC: 103 MMOL/L (ref 99–110)
CO2: 24 MMOL/L (ref 21–32)
CREAT SERPL-MCNC: 1 MG/DL (ref 0.6–1.1)
ETHANOL: 240 MG/DL (ref 0–0.08)
ETHANOL: 43 MG/DL (ref 0–0.08)
GFR AFRICAN AMERICAN: >60
GFR NON-AFRICAN AMERICAN: >60
GLOBULIN: 3.3 G/DL
GLUCOSE BLD-MCNC: 103 MG/DL (ref 70–99)
POTASSIUM SERPL-SCNC: 3.6 MMOL/L (ref 3.5–5.1)
SALICYLATE, SERUM: <0.3 MG/DL (ref 15–30)
SODIUM BLD-SCNC: 143 MMOL/L (ref 136–145)
TOTAL PROTEIN: 7.7 G/DL (ref 6.4–8.2)

## 2019-06-15 PROCEDURE — 6370000000 HC RX 637 (ALT 250 FOR IP): Performed by: PSYCHIATRY & NEUROLOGY

## 2019-06-15 PROCEDURE — G0480 DRUG TEST DEF 1-7 CLASSES: HCPCS

## 2019-06-15 PROCEDURE — 1240000000 HC EMOTIONAL WELLNESS R&B

## 2019-06-15 RX ORDER — HYDROXYZINE PAMOATE 25 MG/1
50 CAPSULE ORAL 3 TIMES DAILY PRN
Status: DISCONTINUED | OUTPATIENT
Start: 2019-06-15 | End: 2019-06-17 | Stop reason: HOSPADM

## 2019-06-15 RX ORDER — ACETAMINOPHEN 325 MG/1
650 TABLET ORAL EVERY 4 HOURS PRN
Status: DISCONTINUED | OUTPATIENT
Start: 2019-06-15 | End: 2019-06-17 | Stop reason: HOSPADM

## 2019-06-15 RX ADMIN — HYDROXYZINE PAMOATE 50 MG: 25 CAPSULE ORAL at 21:15

## 2019-06-15 ASSESSMENT — LIFESTYLE VARIABLES: HISTORY_ALCOHOL_USE: YES

## 2019-06-15 ASSESSMENT — SLEEP AND FATIGUE QUESTIONNAIRES
AVERAGE NUMBER OF SLEEP HOURS: 5
DIFFICULTY STAYING ASLEEP: YES
RESTFUL SLEEP: NO
DO YOU USE A SLEEP AID: NO
DIFFICULTY FALLING ASLEEP: YES
DO YOU HAVE DIFFICULTY SLEEPING: YES
SLEEP PATTERN: DIFFICULTY FALLING ASLEEP;DISTURBED/INTERRUPTED SLEEP
DIFFICULTY ARISING: NO

## 2019-06-15 NOTE — ED NOTES
Patient asleep in bed. Breakfast tray ordered for patient. Will awaken when arrives. Will redraw ethanol level once awoken.       Kp Skelton RN  06/15/19 6000

## 2019-06-15 NOTE — BH NOTE
`[facility]  Admission Note     Admission Type:   Admission Type:  Involuntary    Reason for admission:  Reason for Admission: suicidal thoughts, bought a gun with plans to use to shoot herself    PATIENT STRENGTHS:  Strengths: Communication, Positive Support, Employment, No significant Physical Illness    Patient Strengths and Limitations:  Limitations: Hopeless about future, Tendency to isolate self, General negative or hopeless attitude about future/recovery    Addictive Behavior:   Addictive Behavior  In the past 3 months, have you felt or has someone told you that you have a problem with:  : None  Do you have a history of Chemical Use?: No  Do you have a history of Alcohol Use?: Yes  Do you have a history of Street Drug Abuse?: No  Histroy of Prescripton Drug Abuse?: No    Medical Problems:   Past Medical History:   Diagnosis Date    Febrile seizure ([facility] [address]     age 13     Major depressive disorder, severe ([facility] [address] 6/15/2019       Status EXAM:  Status and Exam  Normal: No  Facial Expression: Sad  Affect: Stable  Level of Consciousness: Alert  Mood:Normal: No  Mood: Depressed, Sad  Motor Activity:Normal: Yes  Interview Behavior: Cooperative  Preception: [place] to Person, [place]c Marten to Time, [place] to Place, [place] to Situation  Attention:Normal: Yes  Thought Content:Normal: Yes  Hallucinations: None  Delusions: No  Memory:Normal: Yes  Insight and Judgment: No  Insight and Judgment: Poor Judgment, Poor Insight  Present Suicidal Ideation: No(prior to coming to ED)  Present Homicidal Ideation: No    Tobacco Screening:  Practical Counseling, on admission, dustin X, if applicable and completed (first 3 are required if patient doesn't refuse):            (X)  Recognizing danger situations (included triggers and roadblocks)                    ( )  Coping skills (new ways to manage stress, exercise, relaxation techniques, changing routine, distraction)                                                           ( ) Basic information about quitting (benefits of quitting, techniques in how to quit, available resources  ( ) Referral for counseling faxed to [facility]                                           ( ) Patient refused counseling  ( ) Patient has not smoked in the last 30 days    Metabolic Screening:    No results found for: LABA1C    Lab Results   Component Value Date    CHOL 139 04/09/2016     Lab Results   Component Value Date    TRIG 53 04/09/2016     Lab Results   Component Value Date    HDL 66 (H) 04/09/2016     No components found for: [facility] [place]  Lab Results   Component Value Date    LABVLDL 11 04/09/2016         Body mass index is 30.34 kg/m². BP Readings from Last 2 Encounters:   06/14/19 (!) 143/94   02/04/19 (!) 101/51           Pt admitted with followings belongings:   dress, shoes, pants. Patient oriented to surroundings and program expectations and copy of patient rights given. Received admission packet:  YES. Consents reviewed, signed YES. . Patient verbalize understanding:  YES.     Patient education on precautions: Rupal Gray RN

## 2019-06-15 NOTE — ED PROVIDER NOTES
Emergency Department Provider Note     Location: [place] [address] ED  6/14/2019    I independently performed a history and physical on [facility]. All diagnostic, treatment, and disposition decisions were made by myself in conjunction with the mid-level provider. Briefly, this is a 34 y.o. female here for psych eval.      ED Triage Vitals [06/14/19 2314]   BP Temp Temp Source Pulse Resp SpO2 Height Weight   (!) 143/94 98.6 °F (37 °C) Oral 90 18 98 % 5' 6\" (1.676 m) 188 lb (85.3 kg)        no acute distress, he is intoxicated, alert and oriented to person and place, and date of birth, but scent of alcohol on her breath, heart RRR, no M/G/R, lungs CTAB, resp. Nonlabored, no abd tenderness, no peritoneal signs/no rebound/no guarding     Patient seen and examined.       Results for orders placed or performed during the hospital encounter of 06/14/19   Urine, reflex to culture   Result Value Ref Range    Color, UA Yellow Straw/Yellow    Clarity, UA SL CLOUDY (A) Clear    Glucose, Ur Negative Negative mg/dL    Bilirubin Urine Negative Negative    Ketones, Urine Negative Negative mg/dL    Specific Gravity, UA 1.010 1.005 - 1.030    Blood, Urine Negative Negative    pH, UA 7.0 5.0 - 8.0    Protein, UA TRACE (A) Negative mg/dL    Urobilinogen, Urine 1.0 <2.0 E.U./dL    Nitrite, Urine Negative Negative    Leukocyte Esterase, Urine Negative Negative    Microscopic Examination YES     Urine Reflex to Culture Not Indicated     Urine Type Not Specified    Pregnancy, Urine   Result Value Ref Range    HCG(Urine) Pregnancy Test Negative Detects HCG level >20 MIU/mL   Drug screen multi urine   Result Value Ref Range    Amphetamine Screen, Urine Neg Negative <1000ng/mL    Barbiturate Screen, Ur Neg Negative <200 ng/mL    Benzodiazepine Screen, Urine Neg Negative <200 ng/mL    Cannabinoid Scrn, Ur Neg Negative <50 ng/mL    Cocaine Metabolite Screen, Urine Neg Negative <300 ng/mL    Opiate Scrn, Ur Neg Negative <300 ng/mL    PCP Screen, Urine Neg Negative <25 ng/mL    Methadone Screen, Urine Neg Negative <300 ng/mL    Propoxyphene Scrn, Ur Neg Negative <300 ng/mL    pH, UA 7.0     Drug Screen Comment: see below     Oxycodone Urine Neg Negative <100 ng/ml   CBC auto differential   Result Value Ref Range    WBC 9.4 4.0 - 11.0 K/uL    RBC 4.48 4.00 - 5.20 M/uL    Hemoglobin 14.9 12.0 - 16.0 g/dL    Hematocrit 43.1 36.0 - 48.0 %    MCV 96.3 80.0 - 100.0 fL    MCH 33.2 26.0 - 34.0 pg    MCHC 34.5 31.0 - 36.0 g/dL    RDW 13.3 12.4 - 15.4 %    Platelets 538 955 - 632 K/uL    MPV 7.1 5.0 - 10.5 fL    Neutrophils % 73.1 %    Lymphocytes % 20.3 %    Monocytes % 5.6 %    Eosinophils % 0.6 %    Basophils % 0.4 %    Neutrophils # 6.8 1.7 - 7.7 K/uL    Lymphocytes # 1.9 1.0 - 5.1 K/uL    Monocytes # 0.5 0.0 - 1.3 K/uL    Eosinophils # 0.1 0.0 - 0.6 K/uL    Basophils # 0.0 0.0 - 0.2 K/uL   Comprehensive metabolic panel   Result Value Ref Range    Sodium 143 136 - 145 mmol/L    Potassium 3.6 3.5 - 5.1 mmol/L    Chloride 103 99 - 110 mmol/L    CO2 24 21 - 32 mmol/L    Anion Gap 16 3 - 16    Glucose 103 (H) 70 - 99 mg/dL    BUN 12 7 - 20 mg/dL    CREATININE 1.0 0.6 - 1.1 mg/dL    GFR Non-African American >60 >60    GFR African American >60 >60    Calcium 8.9 8.3 - 10.6 mg/dL    Total Protein 7.7 6.4 - 8.2 g/dL    Alb 4.4 3.4 - 5.0 g/dL    Albumin/Globulin Ratio 1.3 1.1 - 2.2    Total Bilirubin 0.3 0.0 - 1.0 mg/dL    Alkaline Phosphatase 136 (H) 40 - 129 U/L    ALT 14 10 - 40 U/L    AST 25 15 - 37 U/L    Globulin 3.3 g/dL   Ethanol   Result Value Ref Range    Ethanol Lvl 750 mg/dL   Salicylate   Result Value Ref Range    Salicylate, Serum <0.5 (L) 15.0 - 30.0 mg/dL   Acetaminophen level   Result Value Ref Range    Acetaminophen Level <5 (L) 10 - 30 ug/mL   Microscopic Urinalysis   Result Value Ref Range    Mucus, UA 1+ (A) /LPF    WBC, UA 0-2 0 - 5 /HPF    RBC, UA 0-2 0 - 2 /HPF    Epi Cells 5-10 /HPF    Bacteria, UA 1+ (A) /HPF         For further details of [facility] emergency department encounter, please see KAMILA Lang's documentation. 53-year-old female here for psychiatric evaluation, concern for possible suicidal ideation, awaiting alcohol level to decrease before [facility] evaluation, signed out to Dr. Nicolás Chavez at [facility] for disposition      Clinical Impression:  1. Evaluation by psychiatric service required    2. Acute alcoholic intoxication with complication [facility])      Pending disposition    This chart was generated in part by using Dragon Dictation system and may contain errors related to that system including errors in grammar, punctuation, and spelling, as well as words and phrases that may be inappropriate. If there are any questions or concerns please feel free to contact the dictating provider for clarification.            Duke Carrion,   06/15/19 7806

## 2019-06-15 NOTE — BH NOTE
[facility]  Initial Interdisciplinary Treatment Plan NOTE    Review Date & Time: 06/15/2019 1400    Patient was not in treatment team    Admission Type:   Admission Type:  Involuntary    Reason for admission:  Reason for Admission: suicidal thoughts, bought a gun with plans to use to shoot herself      Estimated Length of Stay Update:  1-3 days   Estimated Discharge Date Update: 1-3 days     PATIENT STRENGTHS:  Patient Strengths Strengths: Communication, Positive Support, Employment, No significant Physical Illness  Patient Strengths and Limitations:Limitations: Hopeless about future, Tendency to isolate self, General negative or hopeless attitude about future/recovery  Addictive Behavior:Addictive Behavior  In the past 3 months, have you felt or has someone told you that you have a problem with:  : None  Do you have a history of Chemical Use?: No  Do you have a history of Alcohol Use?: Yes  Do you have a history of Street Drug Abuse?: No  Histroy of Prescripton Drug Abuse?: No  Medical Problems:  Past Medical History:   Diagnosis Date    Febrile seizure ([facility] [address]     age 13     Major depressive disorder, severe ([facility] [address] 6/15/2019       EDUCATION:   Learner Progress Toward Treatment Goals: Reviewed results and recommendations of this team    Method: Individual    Outcome: Verbalized understanding    PATIENT GOALS: sleep     PLAN/TREATMENT RECOMMENDATIONS UPDATE: maintain safety, medication management     GOALS UPDATE:   Time frame for Short-Term Goals: by time of discharge     Thea Jimenez RN

## 2019-06-15 NOTE — ED PROVIDER NOTES
Department of Emergency Medicine   ED  Provider Note  Admit Date/RoomTime: 6/14/2019 10:58 PM  ED Room: 2306/2306-01  Chief Complaint   Psychiatric Evaluation (pt arrives to to room ten via pd c/o s/i, pd states pt mother found pt to have loaded firarm under her pillow, pd states pt is highly intoxicated an having some life issues. pd states pt bought a firearm and ammo last week but did not know why she did)    History of Present Illness   Source of history provided by:  patient and law enforcement. History/Exam Limitations: none. Yue Adrian is a 34 y.o. old female who has a past medical history of:   Past Medical History:   Diagnosis Date    Febrile seizure ([facility] [address]     age 13     Major depressive disorder, severe ([facility] [address] 6/15/2019    presents to the emergency department escorted by police, on a pink slip, for SI, Suicidal statements to her mother, along with recently purchasing a gun and ammunition. Denies h/o SI or plan. Denies current/ states she gets aparnoid when her dog growls and that is why she bought a gun. She had several drinks tonight \"cocktails\" alone at home. Denies drug or alcohol abuse. No current or past counseling. Denies hallucinations. Denies recent changes in appetie or sleep pattern. She has a prior history of sometimes anxiety and depression. There has been no history of recent trauma denies past trauma or abuse. She denies suicidal ideation and denies homicidal ideation. Quality:      Hopelessness:   No.     Terror:   No.     Confusion:   No.     Hallucinations:   None. Able to care for self: Yes. Able to control self: No.    ROS    Pertinent positives and negatives are stated within HPI, all other systems reviewed and are negative. Past Surgical History:  has a past surgical history that includes shoulder surgery (2008) and Ankle fracture surgery (Right, 2/4/2019). Social History:  reports that she has never smoked.  She has never used smokeless tobacco. She reports that she drinks alcohol. She reports that she does not use drugs. Family History: family history includes Emphysema in her maternal grandmother; Hypertension in her maternal grandfather. Allergies: Patient has no known allergies. Physical Exam           ED Triage Vitals [06/14/19 2314]   BP Temp Temp Source Pulse Resp SpO2 Height Weight   (!) 143/94 98.6 °F (37 °C) Oral 90 18 98 % 5' 6\" (1.676 m) 188 lb (85.3 kg)      Oxygen Saturation Interpretation: Normal.    General Appearance: Disheveled, hair is matted with pieces of grass and straw  Constitutional:   Level of Consciousness: Awake and alert. ETOH: yes         Distress: none. Cooperativeness: cooperative. Eyes:  PERRL, EOMI, no discharge or conjunctival injection. Ears:  External ears without lesions. Throat:  Pharynx without injection, exudate, or tonsillar hypertrophy. Airway patient. Neck:  Normal ROM. Supple. Respiratory:  Clear to auscultation and breath sounds equal.  CV:  Regular rate and rhythm, normal heart sounds, without pathological murmurs, ectopy, gallops, or rubs. GI:  Abdomen Soft, nontender, good bowel sounds. No firm or pulsatile mass. Back:  No costovertebral tenderness. Integument:  Normal turgor. Warm, dry, without visible rash, unless noted elsewhere. Lymphatics: No lymphangitis or adenopathy noted. Neurological:  Oriented. Motor functions intact. Psychiatric: Patient is very guarded with her answers she does not make eye contact. Thought Process:       Coherent:  Yes. Delusions / Paranoia: no evidence of paranoia. Flight of ideas:  No.         Rambling conversation:  No.       Affect: quiet and suspicious. Suicidal ideation:  denies. Homicidal ideation:  no homicidal ideation. Perceptions:  denies any perceptual disturbance present. Insight: below average. Judgement: below average.     Lab / Imaging Results   (All laboratory and

## 2019-06-15 NOTE — ED NOTES
Patient arrived to [facility] AN [facility]FFIL[facility] OF [facility]. Process explained and patient understands.       BAL: 240 @ 8087     ETOH REDRAW @ [address] [place] [address]  06/15/19 0020

## 2019-06-15 NOTE — ED NOTES
Level of Care Disposition: Admit      Patient was seen by ED provider and [facility] staff. The case presented to psychiatric provider on-call Dr. Adalberto Moreno. Based on the ED evaluation and information presented to the provider by this nurse it was determined that inpatient hospitalization is the least restrictive environment for the patient at this time. The patient will be admitted to the inpatient unit. RATIONALE FOR ADMISSION:   Patient has a mental illness: Major Depressive D/O  Patient at imminent risk of danger to self as demonstrated by thoughts of shooting self with a gun she recently purchased. Aborted attempt last evening.       Patient is at imminent risk of violating their own rights AND they could benefit from psychiatric treatment          Insurance Pre certification Authorization: no insurance at this time- pending medicaid       Tia Song RN  06/15/19 5087

## 2019-06-15 NOTE — ED NOTES
Pt currently resting, even, non-labored respirations. No signs of distress. Will continue to monitor for safety.        Amie Jiménez RN  06/15/19 2914

## 2019-06-15 NOTE — ED NOTES
Asleep in treatment room. No apparent distress. Will continue to monitor for pt safety.      James Menjivar  06/15/19 2835

## 2019-06-15 NOTE — ED NOTES
Presenting Problem:  Depression, Suicidal thoughts    Appearance/Hygiene:  hospital attire, fair grooming and fair hygiene   Motor Behavior: wnl   Attitude: cooperative  Affect: flat affect   Speech: soft  Mood: depressed   Thought Processes: [place] and Logical  Perceptions: Absent   Thought content: wnl   Suicidal ideation:  Generalized thoughts, recently bought a gun, at first denied thoughts of killing herself, admitted then she bought gun and was having thought of possibly killing herself  Homicidal ideation:  none  Orientation: A&Ox4   Memory: intact  Concentration: Fair    Insight/ judgement: impaired judgment, impaired insight      Psychosocial and contextual factors: work stress, recent surgery on ankle, financial issues    C-SSRS Summary (including current and past suicidal ideation, plan, intent, and attempts) : current suicidal thoughts recently bought a gun, denies plan at this time, reports was having thoughts of using gun, patient guarded and poor historian    Psychiatric History:     Patient reported diagnosis : denies    Outpatient services/ Provider: none    Previous Inpatient Admissions( including location and dates if known): none    Self-injurious/ Self-harm behavior: denies    History of violence: none    Current Substance use: reports drinks alcohol 3 x week reports a few shots, patient guarded when talking about alcohol use    Trauma identified: none reported    Access to Firearms: recently bought a pistol    ASSESSMENT FOR IMMINENT FUTURE DANGER:      RISK FACTORS:    []  Age <25 or >49   []  Male gender   [x]  Depressed mood   [x]  Active suicidal ideation   [x]  Suicide plan   []  Suicide attempt   [x]  Access to lethal means   []  Prior suicide attempt   []  Active substance abuse   []  Highly impulsive behaviors   []  Not attending to self-care/ADLs    []  Recent significant loss   []  Chronic pain or medical illness   []  Social isolation   []  History of violence   []  Active

## 2019-06-15 NOTE — ED TRIAGE NOTES
Chief Complaint   Patient presents with    Psychiatric Evaluation     pt arrives to to room ten via pd c/o s/i, pd states pt mother found pt to have loaded firarm under her pillow, pd states pt is highly intoxicated an having some life issues.  pd states pt bought a firearm and ammo last week but did not know why she did     Pt states has firearm due to dog barking at back door and dog is not usually like that pt denies any s/i or h/i. Pt states had five mixed vodka drinks

## 2019-06-15 NOTE — ED NOTES
Bed available on [facility]. Report called to Patrizia Ramsey RN charge nurse.       Eder Jacinto RN  06/15/19 1197

## 2019-06-15 NOTE — ED NOTES
Bed: 10  Expected date:   Expected time:   Means of arrival:   Comments:  yemi Sarah RN  06/14/19 8147

## 2019-06-15 NOTE — ED NOTES
Patient admission questions completed. Physical, orientation and belongings need completed once arrives on unit.       Tabitha Duque RN  06/15/19 2455

## 2019-06-15 NOTE — BH NOTE
Per [facility] staff, admission was completed by Rene Mckinnon. Will complete admission note and belongings.

## 2019-06-16 PROCEDURE — 99223 1ST HOSP IP/OBS HIGH 75: CPT | Performed by: PSYCHIATRY & NEUROLOGY

## 2019-06-16 PROCEDURE — 1240000000 HC EMOTIONAL WELLNESS R&B

## 2019-06-16 ASSESSMENT — SLEEP AND FATIGUE QUESTIONNAIRES
RESTFUL SLEEP: YES
DO YOU HAVE DIFFICULTY SLEEPING: YES
DIFFICULTY STAYING ASLEEP: NO
DIFFICULTY FALLING ASLEEP: NO
DO YOU USE A SLEEP AID: NO
DIFFICULTY ARISING: NO
SLEEP PATTERN: NORMAL

## 2019-06-16 ASSESSMENT — LIFESTYLE VARIABLES: HISTORY_ALCOHOL_USE: NO

## 2019-06-16 NOTE — H&P
overwhelmed. She denies changes in her appetite, energy, concentration, nor does she endorse anhedonia. She reports a history of chronic anxiety symptoms that go back to her senior year of high school. She describes panic attacks. She experiences triggered episodes of tachycardia, dyspnea, tremulous net's, and feeling as though \"my mind is going 1 million miles a minute. \" These last for approximately 15 minutes. She denies significant interval anxiety about having another panic attack and denies modifying her behavior to avoid situations or places that might trigger panic attack. Patient indicates that she is previously considered \"I probably need to find somebody to talk to you like a therapist\" that has been unable to do so due to lacking insurance and only working part-time. Duration: Sub acute on chronic  Severity: Severe  Context: Intoxication  Associated symptoms: As above    Past Psychiatric History:    Previous Diagnoses: No formal diagnoses  Previous Hosp:No previous admissions  Outpatient Tx: No previous Outpatient care    Med Trials: No previous med trials  Suicidality: No previous suicide attempts      Past Medical History:  Past Medical History:   Diagnosis Date    Febrile seizure ([facility] [address]     age 13     Major depressive disorder, severe ([facility] [address] 6/15/2019       Home Medications:  Prior to Admission medications    Not on File       Chemical Dependency History:   Tobacco: Denies  Alcohol: Weekly  Illicit: Denies    Family Hx:    Maternal aunt committed suicide    Social Hx:   Developmental: Born and raised in [facility]. Only child. Educational: HS grad.   Vocational: Works part time at [employer]  Marital Status: Never   Children: No children  Housing: Lives at home with mother    Current Medications Ordered:     PRN Meds: acetaminophen, magnesium hydroxide, hydrOXYzine     ROS:    Psychiatric: + for Depressed mood, intermittent suicidal thoughts, recent difficulty sleeping, panic attacks ; Negative for Hallucinations, delusions, homicidal ideation  All other systems were reviewed and negative except as previously documented in HPI. PE:    BP (!) 159/91   Pulse 68   Temp 98 °F (36.7 °C) (Oral)   Resp 16   Ht 5' 6\" (1.676 m)   Wt 188 lb (85.3 kg)   LMP 06/07/2019   SpO2 98%   BMI 30.34 kg/m²       Motor / Gait: no abnormalities noted, nml tone, no involuntary movements, normal gait and station  Cn: II-XII intact    Mental Status Examination:    Appearance: AAF, appears stated age, wearing casual clothing, good grooming and hygiene  Behavior/Attitude toward examiner:  cooperative, attentive, good eye contact  Speech:  spontaneous, normal rate, normal volume and well articulated   Mood:   \"Overwhelmed\"  Affect:  Mood congruent, constricted   Thought processes:  Linear   Thought Content: Presently denies SI, denies HI, no delusions voiced, no obsessions, intermittent hopelessness  Perceptions: Denies AVH, not RTIS  Attention: Fair  Abstraction: Nml  Cognition: Average SARA, Alert and oriented to person, place, time, and situation, recall intact  Insight: Limited insight   Judgment: Limited judgment      LAB:   Admission on 06/14/2019   Component Date Value Ref Range Status    Color, UA 06/14/2019 Yellow  Straw/Yellow Final    Clarity, UA 06/14/2019 SL CLOUDY* Clear Final    Glucose, Ur 06/14/2019 Negative  Negative mg/dL Final    Bilirubin Urine 06/14/2019 Negative  Negative Final    Ketones, Urine 06/14/2019 Negative  Negative mg/dL Final    Specific Gravity, UA 06/14/2019 1.010  1.005 - 1.030 Final    Blood, Urine 06/14/2019 Negative  Negative Final    pH, UA 06/14/2019 7.0  5.0 - 8.0 Final    Protein, UA 06/14/2019 TRACE* Negative mg/dL Final    Urobilinogen, Urine 06/14/2019 1.0  <2.0 E.U./dL Final    Nitrite, Urine 06/14/2019 Negative  Negative Final    Leukocyte Esterase, Urine 06/14/2019 Negative  Negative Final    Microscopic Examination 06/14/2019 YES   Final    Urine Reflex to Culture 06/14/2019 Not Indicated   Final    Urine Type 06/14/2019 Not Specified   Final    HCG(Urine) Pregnancy Test 06/14/2019 Negative  Detects HCG level >20 MIU/mL Final    Comment: Note:  Always repeat results in question with a serum  quantitative pregnancy test. A serum hCG is positive  2-5 days before the urine hCG test.      Amphetamine Screen, Urine 06/14/2019 Neg  Negative <1000ng/mL Final    Barbiturate Screen, Ur 06/14/2019 Neg  Negative <200 ng/mL Final    Benzodiazepine Screen, Urine 06/14/2019 Neg  Negative <200 ng/mL Final    Cannabinoid Scrn, Ur 06/14/2019 Neg  Negative <50 ng/mL Final    Cocaine Metabolite Screen, Urine 06/14/2019 Neg  Negative <300 ng/mL Final    Opiate Scrn, Ur 06/14/2019 Neg  Negative <300 ng/mL Final    Comment: \"Therapeutic levels of pain medication, especially oxycontin and synthetic  opioids, may not be detected by this Methodology. Pain management screen  panel  Drug panel-PM-Hi Res Ur, Interp (PAIN) should be considered for drug  monitoring \".  PCP Screen, Urine 06/14/2019 Neg  Negative <25 ng/mL Final    Methadone Screen, Urine 06/14/2019 Neg  Negative <300 ng/mL Final    Propoxyphene Scrn, Ur 06/14/2019 Neg  Negative <300 ng/mL Final    pH, UA 06/14/2019 7.0   Final    Comment: Urine pH less than 5.0 or greater than 8.0 may indicate sample adulteration. Another sample should be collected if clinically  indicated.  Drug Screen Comment: 06/14/2019 see below   Final    Comment: This method is a screening test to detect only these drug  classes as part of a medical workup. Confirmatory testing  by another method should be ordered if clinically indicated.       Oxycodone Urine 06/14/2019 Neg  Negative <100 ng/ml Final    WBC 06/14/2019 9.4  4.0 - 11.0 K/uL Final    RBC 06/14/2019 4.48  4.00 - 5.20 M/uL Final    Hemoglobin 06/14/2019 14.9  12.0 - 16.0 g/dL Final    Hematocrit 06/14/2019 43.1  36.0 - 48.0 % Final    MCV 06/14/2019 96.3  80.0 - 100.0 fL Final    MCH 06/14/2019 33.2  26.0 - 34.0 pg Final    MCHC 06/14/2019 34.5  31.0 - 36.0 g/dL Final    RDW 06/14/2019 13.3  12.4 - 15.4 % Final    Platelets 16/74/6239 280  135 - 450 K/uL Final    MPV 06/14/2019 7.1  5.0 - 10.5 fL Final    Neutrophils % 06/14/2019 73.1  % Final    Lymphocytes % 06/14/2019 20.3  % Final    Monocytes % 06/14/2019 5.6  % Final    Eosinophils % 06/14/2019 0.6  % Final    Basophils % 06/14/2019 0.4  % Final    Neutrophils # 06/14/2019 6.8  1.7 - 7.7 K/uL Final    Lymphocytes # 06/14/2019 1.9  1.0 - 5.1 K/uL Final    Monocytes # 06/14/2019 0.5  0.0 - 1.3 K/uL Final    Eosinophils # 06/14/2019 0.1  0.0 - 0.6 K/uL Final    Basophils # 06/14/2019 0.0  0.0 - 0.2 K/uL Final    Sodium 06/14/2019 143  136 - 145 mmol/L Final    Potassium 06/14/2019 3.6  3.5 - 5.1 mmol/L Final    Chloride 06/14/2019 103  99 - 110 mmol/L Final    CO2 06/14/2019 24  21 - 32 mmol/L Final    Anion Gap 06/14/2019 16  3 - 16 Final    Glucose 06/14/2019 103* 70 - 99 mg/dL Final    BUN 06/14/2019 12  7 - 20 mg/dL Final    CREATININE 06/14/2019 1.0  0.6 - 1.1 mg/dL Final    GFR Non- 06/14/2019 >60  >60 Final    Comment: >60 mL/min/1.73m2 EGFR, calc. for ages 25 and older using the  MDRD formula (not corrected for weight), is valid for stable  renal function.  GFR  06/14/2019 >60  >60 Final    Comment: Chronic Kidney Disease: less than 60 ml/min/1.73 sq.m. Kidney Failure: less than 15 ml/min/1.73 sq.m. Results valid for patients 18 years and older.       Calcium 06/14/2019 8.9  8.3 - 10.6 mg/dL Final    Total Protein 06/14/2019 7.7  6.4 - 8.2 g/dL Final    Alb 06/14/2019 4.4  3.4 - 5.0 g/dL Final    Albumin/Globulin Ratio 06/14/2019 1.3  1.1 - 2.2 Final    Total Bilirubin 06/14/2019 0.3  0.0 - 1.0 mg/dL Final    Alkaline Phosphatase 06/14/2019 136* 40 - 129 U/L Final    ALT 06/14/2019 14  10 - 40 U/L Final    AST 06/14/2019 25 that she is minimizing symptoms, and ongoing exploration of her current symptoms, in addition to behavioral observation on the unit, may very well reveal that she is indeed experiencing an episode of major depression.  -Most critically it appears the patient would benefit from referral to outpatient mental health treatment be that psychotherapy only, or a combination of med management and psychotherapy/counseling.  -Patient reticent to start medication at this point. Given the history above, starting antidepressant is not clearly indicated at this point in time. Will hold off on starting any medication until more history can be obtained. -Need to obtain collateral information and engage in safety planning from patient's family. Chemical Dependency Issues:  No issues. Function:  No acute functional needs    Medical Problems:  Internal medicine has been consulted. Appreciate recs. No acute or chronic medical problems    Code Status: Full code    Disposition:    -Housing: With mother  -Current outpatient follow-up: Not yet established    Estimated length of stay: 2 to 4 days    Prognosis:  Fair     Criteria for Discharge:  Not psychotic, not homicidal, not suicidal, behavioral disturbance under control, sleeping well, mood improved/stable, eating well, aftercare arranged. Spent > 70 minutes face to face with patient of which >50% was spent counseling and providing education regarding diagnosis, treatment options, and prognosis.     Stephanie Collazo MD  Staff Psychiatrist
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
## 2019-06-16 NOTE — PLAN OF CARE
Active in groups, engaged in Tx. Pleasant and cooperative, goal-oriented and logical thought process. Pt denies SI/HI and A/V shay.  No self-harm thus far this shift. Pt contracts for safety and has maintained safe Bx on unit. Minimal interactions with peers.

## 2019-06-16 NOTE — BH NOTE
Writer completed psychosocial assessment and C-SSRS. Pt was cooperative during assessment. Pt denies suicidal ideations/homicidal ideations. Pt denies hallucinations/delusions. Pt reports since January her anxiety has been increasing. She reports she has been having suicidal thoughts recently but has no plan or intent. She currently denies any suicidal ideations, she reports this was her cry for help. She reports she does not want to die. She lives with her mom and her mother is a great support for her as well as her friends and her dog. She reports she is currently employed at [employer].      Margarita Calles MSW,LSW

## 2019-06-16 NOTE — GROUP NOTE
Group Therapy Note    Date: June 16    Group Start Time: 0120  Group End Time: 0240  Group Topic: Cognitive Skills    [facility]    EMA Stevenson        Group Therapy Note    Attendees: 11         Discussed healthy communication skills, time management, and Interpersonal effectiveness. Notes:  Pt was fully engaged in group conversation and followed along with handouts given on the subject material. Pt was able to demonstrate effective communication skills. Status After Intervention:  Improved    Participation Level: Active Listener and Interactive    Participation Quality: Appropriate and Attentive      Speech:  normal      Thought Process/Content: Logical      Affective Functioning: Constricted/Restricted      Mood: anxious      Level of consciousness:  Alert      Response to Learning: Able to verbalize current knowledge/experience, Able to verbalize/acknowledge new learning and Able to retain information      Endings: None Reported    Modes of Intervention: Education, Socialization, Exploration, Clarifying and Problem-solving      Discipline Responsible: /Counselor      Signature:   EMA Stevenson

## 2019-06-17 VITALS
OXYGEN SATURATION: 98 % | SYSTOLIC BLOOD PRESSURE: 139 MMHG | HEIGHT: 66 IN | BODY MASS INDEX: 30.22 KG/M2 | HEART RATE: 69 BPM | DIASTOLIC BLOOD PRESSURE: 81 MMHG | WEIGHT: 188 LBS | RESPIRATION RATE: 16 BRPM | TEMPERATURE: 97.3 F

## 2019-06-17 PROCEDURE — 5130000000 HC BRIDGE APPOINTMENT

## 2019-06-17 PROCEDURE — 99239 HOSP IP/OBS DSCHRG MGMT >30: CPT | Performed by: PSYCHIATRY & NEUROLOGY

## 2019-06-17 NOTE — GROUP NOTE
Group Therapy Note    Date: June 17    Group Start Time: 1000  Group End Time: 1100  Group Topic: Psychoeducation    [address] [place], [place] [address]ill  [facility]        Group Therapy Note    Attendees: 10         Patient's Goal:  Patients were invited to participate in Art Therapy / Psycho-Education group on art making as a tool for coping skills and stress reduction. Using an art material of their choosing, clients were encouraged to reflect upon their mood and what they needed for the day and were asked to share and process their work with the group. Notes:  Audie Mays participated in conversation with peers while engaging in art making on coping and stress reduction. Audie Mays had to leave group during session to meet with another staff member and did not have an opportunity to share her work. Status After Intervention:  Improved    Participation Level:  Active Listener and Interactive    Participation Quality: Appropriate, Attentive and Sharing      Speech:  hesitant      Thought Process/Content: Linear      Affective Functioning: Blunted      Mood: anxious      Level of consciousness:  Alert, Oriented x4 and Attentive      Response to Learning: Able to verbalize current knowledge/experience, Able to verbalize/acknowledge new learning, Able to retain information, Capable of insight and Able to change behavior      Endings: None Reported    Modes of Intervention: Education, Support, Socialization and Exploration      Discipline Responsible: Psychoeducational Specialist      Signature:  Johnny Lowe

## 2019-06-17 NOTE — BH NOTE
Bridge Appointment completed: Reviewed Discharge Instructions with patient. Patient verbalizes understanding and agreement with the discharge plan using the teachback method.      Referral for Outpatient Tobacco Cessation Counseling, upon discharge (dustin X if applicable and completed):    ( )  Hospital staff assisted patient to call Quit Line or faxed referral                                   during hospitalization                  ( x )  Recognizing danger situations (included triggers and roadblocks), if not completed on admission                    ( x )  Coping skills (new ways to manage stress, exercise, relaxation techniques, changing routine, distraction), if not completed on admission                                                           ( x )  Basic information about quitting (benefits of quitting, techniques in how to quit, available resources, if not completed on admission  ( ) Referral for counseling faxed to [facility]   ( ) Patient refused referral  ( ) Patient refused counseling  ( x ) Patient refused smoking cessation medication upon discharge

## 2019-06-17 NOTE — PROGRESS NOTES
Comments: + interactions, + contribution, and + engagement.         Time: 2030-2130      Type of Group: wrap up/relaxation      Level of Participation: 15/16

## 2019-06-17 NOTE — GROUP NOTE
Group Therapy Note    Date: June 17    Group Start Time: 1600  Group End Time: 1640  Group Topic: Healthy Living/Wellness    [address] [place] [address] NADER Dunlap        Group Therapy Note    Attendees: 8         Patient's Goal:  Patients will recognize healthy coping skills and ways to incorporate into daily lives. Status After Intervention:  Improved    Participation Level:  Active Listener    Participation Quality: Appropriate and Attentive      Speech:  normal      Thought Process/Content: Logical  Linear      Affective Functioning: Congruent      Mood: euthymic      Level of consciousness:  Alert, Oriented x4 and Attentive      Response to Learning: Able to verbalize current knowledge/experience and Able to verbalize/acknowledge new learning      Endings: None Reported    Modes of Intervention: Education and Support      Discipline Responsible: Registered Nurse      Signature:  Mauri Ornelas RN

## 2019-06-17 NOTE — FLOWSHEET NOTE
Group Therapy Note    Date: 6/17/2019  Start Time: 1330  End Time:  9014  Number of Participants: 7    Type of Group: [facility]    Notes:  Pt present for [employer]. Pt sat quietly and listened during group. Participation Level:  Active Listener and Minimal    Participation Quality: Attentive and Resistant      Speech:  hesitant      Affective Functioning: Blunted      Endings: None Reported    Modes of Intervention: Support, Socialization and Exploration      Discipline Responsible: Chaplain Bee De Leon       06/17/19 1518   Encounter Summary   Services provided to: Patient   Continue Visiting   (6/17 [facility] Service)   Complexity of Encounter Moderate   Length of Encounter 45 minutes

## 2019-06-17 NOTE — PROGRESS NOTES
Chief Complaint:  depressed  Patients chart was reviewed and collaborated with staff as well around discharge planning. Reviewed with the patient. Dictated discharge summary. At this time patient is not probateable or holdable and is not suicidal/homicidal. Spent 35 minutes on discharge, and more then 50 % of that time was spent with counseling patient on discharge goals.

## 2019-06-17 NOTE — PLAN OF CARE
Patient rates Depression 1/10 and Anxiety 1/10. Patient denies SI/HI/A/V/H. Patient visible on the milieu. Patient attended and participated in wrap up group. Patient doesn't have HS medications scheduled. Patient resting quietly in bed. No c/o's voiced at present.

## 2019-06-17 NOTE — PLAN OF CARE
Pt A&O, visible on unit, attends groups and community meeting. Pt social with peers, pleasant and cooperative with care denies SI, HI, and AVH, no distress noted.
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
## 2021-12-28 NOTE — ED NOTES
[facility] [place] [facility] to [facility].      Keri Johnson RN  06/15/19 5261 No

## (undated) DEVICE — STOCKINETTE,IMPERVIOUS,12X48,STERILE: Brand: MEDLINE

## (undated) DEVICE — MAJOR SET UP PK

## (undated) DEVICE — SUTURE VCRL + SZ 3-0 L18IN ABSRB UD SH 1/2 CIR TAPERCUT NDL VCP864D

## (undated) DEVICE — PADDING CAST W6INXL4YD NONSTERILE COT RAYON MICROPLEATED

## (undated) DEVICE — C-ARM: Brand: UNBRANDED

## (undated) DEVICE — TIP SUCT DIA12FR W STYL CTRL VENT DISPOSABLE FRAZ

## (undated) DEVICE — SUTURE VCRL + SZ 0 L27IN ABSRB UD L36MM CT-1 1/2 CIR VCPP41D

## (undated) DEVICE — SUTURE VCRL + SZ 2-0 L18IN ABSRB UD CT1 L36MM 1/2 CIR VCP839D

## (undated) DEVICE — SPLINT ORTH W4XL30IN LAYERED FBRGLS FOAM PD BRTH BK MOLD

## (undated) DEVICE — OCCLUSIVE GAUZE STRIP,3% BISMUTH TRIBROMOPHENATE IN PETROLATUM BLEND: Brand: XEROFORM

## (undated) DEVICE — NEEDLE HYPO 25GA L1.5IN BLU POLYPR HUB S STL REG BVL STR

## (undated) DEVICE — EVOS SMALL 2.0MM DRILL W/AO QC SHORT: Brand: EVOS

## (undated) DEVICE — ELECTRODE PT RET AD L9FT HI MOIST COND ADH HYDRGEL CORDED

## (undated) DEVICE — EVOS SMALL 2.5MM DRILL W/AO QC SHORT: Brand: EVOS

## (undated) DEVICE — BANDAGE COMPR ELASTIC 5 YDX4 IN LT

## (undated) DEVICE — SOLUTION IV IRRIG 500ML 0.9% SODIUM CHL 2F7123

## (undated) DEVICE — PADDING UNDERCAST W4INXL4YD 100% COT CRIMPED FINISH WBRL II

## (undated) DEVICE — EVOS SMALL 2.7MM OVER DRILL W/AO                                    QC SHORT: Brand: EVOS

## (undated) DEVICE — CHLORAPREP 26ML ORANGE

## (undated) DEVICE — 3M™ COBAN™ NL STERILE NON-LATEX SELF-ADHERENT WRAP, 2084S, 4 IN X 5 YD (10 CM X 4,5 M), 18 ROLLS/CASE: Brand: 3M™ COBAN™

## (undated) DEVICE — ESMARK: Brand: DEROYAL